# Patient Record
Sex: FEMALE | Race: WHITE | NOT HISPANIC OR LATINO
[De-identification: names, ages, dates, MRNs, and addresses within clinical notes are randomized per-mention and may not be internally consistent; named-entity substitution may affect disease eponyms.]

---

## 2018-05-08 ENCOUNTER — RECORD ABSTRACTING (OUTPATIENT)
Age: 73
End: 2018-05-08

## 2018-05-08 DIAGNOSIS — Z87.19 PERSONAL HISTORY OF OTHER DISEASES OF THE DIGESTIVE SYSTEM: ICD-10-CM

## 2018-05-08 DIAGNOSIS — Z98.890 OTHER SPECIFIED POSTPROCEDURAL STATES: ICD-10-CM

## 2018-05-08 DIAGNOSIS — H43.819 VITREOUS DEGENERATION, UNSPECIFIED EYE: ICD-10-CM

## 2018-05-08 RX ORDER — METOPROLOL SUCCINATE 200 MG/1
TABLET, EXTENDED RELEASE ORAL DAILY
Refills: 0 | Status: ACTIVE | COMMUNITY

## 2018-05-08 RX ORDER — LEVOTHYROXINE SODIUM 0.17 MG/1
TABLET ORAL DAILY
Refills: 0 | Status: ACTIVE | COMMUNITY

## 2018-05-08 RX ORDER — PANTOPRAZOLE 40 MG/1
TABLET, DELAYED RELEASE ORAL DAILY
Refills: 0 | Status: ACTIVE | COMMUNITY

## 2018-05-08 RX ORDER — CALCIUM CARBONATE/VITAMIN D3 600 MG-20
TABLET ORAL
Refills: 0 | Status: ACTIVE | COMMUNITY

## 2018-05-08 RX ORDER — MONTELUKAST SODIUM 10 MG/1
TABLET, FILM COATED ORAL
Refills: 0 | Status: ACTIVE | COMMUNITY

## 2018-05-08 RX ORDER — EPINEPHRINE 0.3 MG/.3ML
INJECTION INTRAMUSCULAR
Refills: 0 | Status: ACTIVE | COMMUNITY

## 2018-05-08 RX ORDER — MULTIVITAMIN
CAPSULE ORAL
Refills: 0 | Status: ACTIVE | COMMUNITY

## 2018-06-15 ENCOUNTER — APPOINTMENT (OUTPATIENT)
Dept: BREAST CENTER | Facility: CLINIC | Age: 73
End: 2018-06-15
Payer: MEDICARE

## 2018-06-15 VITALS
SYSTOLIC BLOOD PRESSURE: 125 MMHG | HEART RATE: 67 BPM | BODY MASS INDEX: 23.92 KG/M2 | DIASTOLIC BLOOD PRESSURE: 68 MMHG | WEIGHT: 130 LBS | HEIGHT: 62 IN

## 2018-06-15 DIAGNOSIS — Z87.891 PERSONAL HISTORY OF NICOTINE DEPENDENCE: ICD-10-CM

## 2018-06-15 DIAGNOSIS — Z80.1 FAMILY HISTORY OF MALIGNANT NEOPLASM OF TRACHEA, BRONCHUS AND LUNG: ICD-10-CM

## 2018-06-15 DIAGNOSIS — E07.9 DISORDER OF THYROID, UNSPECIFIED: ICD-10-CM

## 2018-06-15 DIAGNOSIS — Z83.49 FAMILY HISTORY OF OTHER ENDOCRINE, NUTRITIONAL AND METABOLIC DISEASES: ICD-10-CM

## 2018-06-15 DIAGNOSIS — Z82.49 FAMILY HISTORY OF ISCHEMIC HEART DISEASE AND OTHER DISEASES OF THE CIRCULATORY SYSTEM: ICD-10-CM

## 2018-06-15 PROCEDURE — 99215 OFFICE O/P EST HI 40 MIN: CPT

## 2018-06-15 RX ORDER — PRAVASTATIN SODIUM 20 MG/1
20 TABLET ORAL
Qty: 90 | Refills: 0 | Status: ACTIVE | COMMUNITY
Start: 2018-03-16

## 2018-06-15 RX ORDER — PREDNISONE 10 MG/1
10 TABLET ORAL
Qty: 9 | Refills: 0 | Status: DISCONTINUED | COMMUNITY
Start: 2018-05-18

## 2018-06-15 RX ORDER — LOTEPREDNOL ETABONATE AND TOBRAMYCIN 5; 3 MG/ML; MG/ML
0.5-0.3 SUSPENSION/ DROPS OPHTHALMIC
Qty: 5 | Refills: 0 | Status: DISCONTINUED | COMMUNITY
Start: 2018-04-08

## 2018-06-15 RX ORDER — AMOXICILLIN 875 MG/1
875 TABLET, FILM COATED ORAL
Qty: 20 | Refills: 0 | Status: DISCONTINUED | COMMUNITY
Start: 2018-01-03

## 2018-06-15 RX ORDER — PRAVASTATIN SODIUM 40 MG/1
TABLET ORAL DAILY
Refills: 0 | Status: DISCONTINUED | COMMUNITY
End: 2018-06-15

## 2018-06-15 RX ORDER — LEVOFLOXACIN 500 MG/1
500 TABLET, FILM COATED ORAL
Qty: 7 | Refills: 0 | Status: DISCONTINUED | COMMUNITY
Start: 2018-06-04

## 2018-06-15 RX ORDER — ALPRAZOLAM 0.25 MG/1
0.25 TABLET ORAL
Qty: 90 | Refills: 0 | Status: ACTIVE | COMMUNITY
Start: 2018-02-23

## 2018-06-15 RX ORDER — CEFDINIR 300 MG/1
300 CAPSULE ORAL
Qty: 10 | Refills: 0 | Status: DISCONTINUED | COMMUNITY
Start: 2018-05-24

## 2018-06-15 RX ORDER — FLUCONAZOLE 150 MG/1
150 TABLET ORAL
Qty: 2 | Refills: 0 | Status: DISCONTINUED | COMMUNITY
Start: 2018-05-11

## 2018-06-15 RX ORDER — AMOXICILLIN 500 MG/1
500 CAPSULE ORAL
Qty: 21 | Refills: 0 | Status: DISCONTINUED | COMMUNITY
Start: 2018-03-26

## 2018-06-15 RX ORDER — GABAPENTIN 300 MG/1
300 CAPSULE ORAL
Qty: 90 | Refills: 0 | Status: DISCONTINUED | COMMUNITY
Start: 2018-01-05

## 2018-06-15 RX ORDER — HYDROCORTISONE 25 MG/G
2.5 CREAM TOPICAL
Qty: 57 | Refills: 0 | Status: ACTIVE | COMMUNITY
Start: 2017-12-26

## 2018-06-15 RX ORDER — CEFUROXIME AXETIL 250 MG/1
250 TABLET ORAL
Qty: 14 | Refills: 0 | Status: DISCONTINUED | COMMUNITY
Start: 2018-05-23

## 2019-06-14 ENCOUNTER — APPOINTMENT (OUTPATIENT)
Dept: BREAST CENTER | Facility: CLINIC | Age: 74
End: 2019-06-14

## 2019-06-17 ENCOUNTER — APPOINTMENT (OUTPATIENT)
Dept: BREAST CENTER | Facility: CLINIC | Age: 74
End: 2019-06-17
Payer: MEDICARE

## 2019-06-17 VITALS — WEIGHT: 135 LBS | HEIGHT: 62 IN | BODY MASS INDEX: 24.84 KG/M2

## 2019-06-17 PROCEDURE — 99215 OFFICE O/P EST HI 40 MIN: CPT

## 2019-06-17 NOTE — HISTORY OF PRESENT ILLNESS
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 She is s/p left 10-11:00 retro USGBx 6/15/15 pathology showed dense stromal fibrosis and no atypia nor malignancy.\par \par \par Patient has no breast complaints today. Patient had breast imaging today which was normal. She is still suffering form her allergies and sinus issues. \par \par She does SBE.\par She has not noticed a change in her breast or a breast lump.\par She has not noticed a change in her nipple or nipple area.\par She is not experiencing nipple discharge.\par She is not  experiencing breast pain.\par She has not noticed a lump or lymph node in her axilla.\par She has no other breast concerns. \par \par \par BREAST CANCER RISK FACTORS\par Menarche: 13\par Menopause: 49\par \par OCP: N\par HRT: Y (unsure how long)\par Hysterectomy/Oophorectomy: N\par Last pap/pelvic: 2018. has appointment next month\par SBE: Yes no concerns at this time\par Family History:  N\par Ashkenazi: Y\par BRCA testing: N\par TC/NCI: 12.13%\par Last colonoscopy:  which was normal\par \par Last mammogram: 2019                 Location:  [place]\par The report was reviewed.                                                                    Images reviewed on PACS.    \par Results: BIRADS 2\par No evidence of malignancy, Heterogenously dense breast tissue\par \par Last ultrasound: 2019             Location[place]  [place]\par The report was reviewed.                                                                        Images reviewed on PACS.\par Results: BIRADS 2\par No evidence of malignancy

## 2019-06-17 NOTE — ASSESSMENT
[FreeTextEntry1] : 72 yo female followed for ADH/ALH\par does not want screening mri\par due for mg/sono JUN 2020\par f/u in one year\par We reviewed risk reduction strategies including maintaining a BMI <25, limiting red meat intake and alcoholic beverages to 3 per week and exercise (150 min/ week low intensity or 75 min/week high intensity). And maintaining a normal vitamin D level.\par \par \par She knows to call or return sooner should any changes or concerns arise.\par \par

## 2019-06-17 NOTE — REVIEW OF SYSTEMS
[Sore Throat] : sore throat [Constipation] : constipation [Joint Pain] : joint pain [Negative] : Heme/Lymph [FreeTextEntry4] : Sinus Pressure [FreeTextEntry9] : Back Pain

## 2019-06-17 NOTE — PHYSICAL EXAM
[Normocephalic] : normocephalic [Atraumatic] : atraumatic [Supple] : supple [Examined in the supine and seated position] : examined in the supine and seated position [No dominant masses] : no dominant masses in right breast  [No Supraclavicular Adenopathy] : no supraclavicular adenopathy [No Nipple Retraction] : no right nipple retraction [No dominant masses] : no dominant masses left breast [No Nipple Discharge] : no right nipple discharge [No Axillary Lymphadenopathy] : no left axillary lymphadenopathy [No Ulceration] : no ulceration [No Edema] : no edema [No Rashes] : no rashes [de-identified] : s/p PMx [de-identified] : healed axillary incision

## 2020-06-22 ENCOUNTER — APPOINTMENT (OUTPATIENT)
Dept: BREAST CENTER | Facility: CLINIC | Age: 75
End: 2020-06-22
Payer: MEDICARE

## 2020-06-22 VITALS
SYSTOLIC BLOOD PRESSURE: 130 MMHG | WEIGHT: 135 LBS | BODY MASS INDEX: 24.84 KG/M2 | HEART RATE: 86 BPM | DIASTOLIC BLOOD PRESSURE: 75 MMHG | HEIGHT: 62 IN

## 2020-06-22 DIAGNOSIS — Z12.31 ENCOUNTER FOR SCREENING MAMMOGRAM FOR MALIGNANT NEOPLASM OF BREAST: ICD-10-CM

## 2020-06-22 PROCEDURE — 99215 OFFICE O/P EST HI 40 MIN: CPT

## 2020-06-22 NOTE — PHYSICAL EXAM
[Normocephalic] : normocephalic [Supple] : supple [Atraumatic] : atraumatic [No Supraclavicular Adenopathy] : no supraclavicular adenopathy [Examined in the supine and seated position] : examined in the supine and seated position [No dominant masses] : no dominant masses in right breast  [No dominant masses] : no dominant masses left breast [No Nipple Retraction] : no left nipple retraction [No Nipple Discharge] : no left nipple discharge [No Axillary Lymphadenopathy] : no left axillary lymphadenopathy [No Edema] : no edema [No Rashes] : no rashes [No Ulceration] : no ulceration [de-identified] : s/p PMx [de-identified] : healed axillary incision

## 2020-06-22 NOTE — HISTORY OF PRESENT ILLNESS
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 She is s/p left 10-11:00 retro USGBx 6/15/15 pathology showed dense stromal fibrosis and no atypia nor malignancy.\par \par \par Patient has no breast complaints today. Patient had breast imaging today. She is still suffering form her allergies and sinus issues. \par \par She does SBE.\par She has not noticed a change in her breast or a breast lump.\par She has not noticed a change in her nipple or nipple area.\par She is not experiencing nipple discharge.\par She is not  experiencing breast pain.\par She has not noticed a lump or lymph node in her axilla.\par She has no other breast concerns. \par \par \par BREAST CANCER RISK FACTORS\par Menarche: 13\par Menopause: 49\par \par OCP: N\par HRT: Y (unsure how long)\par Hysterectomy/Oophorectomy: N\par Last pap/pelvic: 2019 WNL \par SBE: Yes no concerns at this time\par Family History:  N\par Ashkenazi: Y\par BRCA testing: N\par TC/NCI: 12.13%\par Last colonoscopy:  which was normal\par \par Last mammogram: 2020                 Location:  [place]\par The report was reviewed.                     Images viewed on PACS\par Results; BIRADS 2 No suspicious findings                                                                 \par \par Last ultrasound: 2020                 Location:  [place]\par The report was reviewed.                     Images viewed on PACS\par Results; BIRADS 2 No suspicious findings

## 2020-06-22 NOTE — ASSESSMENT
[FreeTextEntry1] : 73 yo female followed for ADH/ALH\par does not want screening mri\par vit d is 42\par due for mg/sono JUN 2021\par f/u in one year\par We reviewed risk reduction strategies including maintaining a BMI <25, limiting red meat intake and alcoholic beverages to 3 per week and exercise (150 min/ week low intensity or 75 min/week high intensity). And maintaining a normal vitamin D level.\par \par \par She knows to call or return sooner should any changes or concerns arise.\par \par

## 2020-12-23 PROBLEM — Z12.31 ENCOUNTER FOR SCREENING MAMMOGRAM FOR MALIGNANT NEOPLASM OF BREAST: Status: RESOLVED | Noted: 2018-05-08 | Resolved: 2020-12-23

## 2021-06-28 ENCOUNTER — APPOINTMENT (OUTPATIENT)
Dept: BREAST CENTER | Facility: CLINIC | Age: 76
End: 2021-06-28
Payer: MEDICARE

## 2021-06-28 ENCOUNTER — NON-APPOINTMENT (OUTPATIENT)
Age: 76
End: 2021-06-28

## 2021-06-28 VITALS
DIASTOLIC BLOOD PRESSURE: 75 MMHG | SYSTOLIC BLOOD PRESSURE: 147 MMHG | BODY MASS INDEX: 24.11 KG/M2 | HEART RATE: 67 BPM | HEIGHT: 62 IN | WEIGHT: 131 LBS

## 2021-06-28 DIAGNOSIS — Z80.8 FAMILY HISTORY OF MALIGNANT NEOPLASM OF OTHER ORGANS OR SYSTEMS: ICD-10-CM

## 2021-06-28 DIAGNOSIS — Z12.39 ENCOUNTER FOR OTHER SCREENING FOR MALIGNANT NEOPLASM OF BREAST: ICD-10-CM

## 2021-06-28 DIAGNOSIS — Z80.42 FAMILY HISTORY OF MALIGNANT NEOPLASM OF PROSTATE: ICD-10-CM

## 2021-06-28 PROCEDURE — 99214 OFFICE O/P EST MOD 30 MIN: CPT

## 2021-06-28 RX ORDER — FLUTICASONE PROPIONATE AND SALMETEROL 50; 500 UG/1; UG/1
POWDER RESPIRATORY (INHALATION)
Refills: 0 | Status: DISCONTINUED | COMMUNITY
End: 2021-06-28

## 2021-06-28 RX ORDER — CLOTRIMAZOLE AND BETAMETHASONE DIPROPIONATE 10; .5 MG/G; MG/G
1-0.05 CREAM TOPICAL
Qty: 15 | Refills: 0 | Status: DISCONTINUED | COMMUNITY
Start: 2018-06-04 | End: 2021-06-28

## 2021-06-28 RX ORDER — ALENDRONATE SODIUM 70 MG/1
70 TABLET ORAL
Refills: 0 | Status: DISCONTINUED | COMMUNITY
End: 2021-06-28

## 2021-06-28 RX ORDER — ALBUTEROL SULFATE 108 UG/1
AEROSOL, METERED RESPIRATORY (INHALATION)
Refills: 0 | Status: DISCONTINUED | COMMUNITY
End: 2021-06-28

## 2021-06-28 RX ORDER — DENOSUMAB 60 MG/ML
60 INJECTION SUBCUTANEOUS
Refills: 0 | Status: ACTIVE | COMMUNITY

## 2021-06-28 NOTE — ASSESSMENT
[FreeTextEntry1] : 74 yo female followed for ADH/ALH\par does not want screening mri\par vit d is 64\par due for mg/sono JUN 2022\par f/u in one year\par We reviewed risk reduction strategies including maintaining a BMI <25, limiting red meat intake and alcoholic beverages to 3 per week and exercise (150 min/ week low intensity or 75 min/week high intensity). And maintaining a normal vitamin D level.\par \par \par She knows to call or return sooner should any changes or concerns arise.\par \par

## 2021-06-28 NOTE — PHYSICAL EXAM
[Normocephalic] : normocephalic [Atraumatic] : atraumatic [Supple] : supple [No Supraclavicular Adenopathy] : no supraclavicular adenopathy [Examined in the supine and seated position] : examined in the supine and seated position [No dominant masses] : no dominant masses in right breast  [No dominant masses] : no dominant masses left breast [No Nipple Retraction] : no left nipple retraction [No Nipple Discharge] : no left nipple discharge [No Axillary Lymphadenopathy] : no left axillary lymphadenopathy [No Edema] : no edema [No Rashes] : no rashes [No Ulceration] : no ulceration [Symmetrical] : symmetrical [de-identified] : s/p PMx [de-identified] : healed axillary incision

## 2021-06-28 NOTE — HISTORY OF PRESENT ILLNESS
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 She is s/p left 10-11:00 retro USGBx 6/15/15 pathology showed dense stromal fibrosis and no atypia nor malignancy.\par \par \par Patient has no breast complaints today. Patient had breast imaging today. She is still suffering form her allergies and sinus issues. She completed [employer] left arm 2021.\par \par She does SBE.\par She has not noticed a change in her breast or a breast lump.\par She has not noticed a change in her nipple or nipple area.\par She is not experiencing nipple discharge.\par She is not  experiencing breast pain.\par She has not noticed a lump or lymph node in her axilla.\par She has no other breast concerns. \par \par \par BREAST CANCER RISK FACTORS\par Menarche: 13\par Menopause: 49\par \par OCP: N\par HRT: Y (unsure how long)\par Hysterectomy/Oophorectomy: N\par Last pap/pelvic: 2019 WNL  Pt has appt for 2021\par SBE: Yes no concerns at this time\par Family History:  N\par Ashkenazi: Y\par BRCA testing: N\par TC/NCI: 12.13%\par Last colonoscopy:  which was normal\par \par Last mammogram: 2021                 Location:  [place]\par The report was reviewed.                     Images viewed on PACS\par Results; BIRADS 2\par Scattered fibroglandular densities.\par No evidence of malignancy.                                                               \par \par Last ultrasound: 2021                 Location:  [place]\par The report was reviewed.                     Images viewed on PACS\par Results; BIRADS 2\par  No suspicious findings

## 2022-07-17 ENCOUNTER — RESULT REVIEW (OUTPATIENT)
Age: 77
End: 2022-07-17

## 2022-07-18 ENCOUNTER — APPOINTMENT (OUTPATIENT)
Dept: BREAST CENTER | Facility: CLINIC | Age: 77
End: 2022-07-18

## 2022-07-18 VITALS
SYSTOLIC BLOOD PRESSURE: 145 MMHG | DIASTOLIC BLOOD PRESSURE: 75 MMHG | WEIGHT: 131 LBS | HEIGHT: 62 IN | BODY MASS INDEX: 24.11 KG/M2 | HEART RATE: 74 BPM

## 2022-07-18 DIAGNOSIS — Z00.00 ENCOUNTER FOR GENERAL ADULT MEDICAL EXAMINATION W/OUT ABNORMAL FINDINGS: ICD-10-CM

## 2022-07-18 DIAGNOSIS — E78.00 PURE HYPERCHOLESTEROLEMIA, UNSPECIFIED: ICD-10-CM

## 2022-07-18 DIAGNOSIS — J45.909 UNSPECIFIED ASTHMA, UNCOMPLICATED: ICD-10-CM

## 2022-07-18 DIAGNOSIS — I10 ESSENTIAL (PRIMARY) HYPERTENSION: ICD-10-CM

## 2022-07-18 PROCEDURE — 99214 OFFICE O/P EST MOD 30 MIN: CPT

## 2022-07-18 RX ORDER — ASPIRIN 81 MG
81 TABLET, DELAYED RELEASE (ENTERIC COATED) ORAL DAILY
Refills: 0 | Status: DISCONTINUED | COMMUNITY
End: 2022-07-18

## 2022-07-18 NOTE — ASSESSMENT
[FreeTextEntry1] : 77 yo female followed for ADH/ALH\par Discussed screening MRI again with patient and she does not want screening mri\par vit d level recently was 27 - patient is seeing an endocrinologist in October for her low vitamin D \par due for mg/sono JUN 2023\par f/u in one year\par We reviewed risk reduction strategies including maintaining a BMI <25, limiting red meat intake and alcoholic beverages to 3 per week and exercise (150 min/ week low intensity or 75 min/week high intensity). And maintaining a normal vitamin D level.\par \par \par She knows to call or return sooner should any changes or concerns arise.\par \par

## 2022-07-18 NOTE — PHYSICAL EXAM
[Normocephalic] : normocephalic [Atraumatic] : atraumatic [Supple] : supple [No Supraclavicular Adenopathy] : no supraclavicular adenopathy [Examined in the supine and seated position] : examined in the supine and seated position [Symmetrical] : symmetrical [No dominant masses] : no dominant masses in right breast  [No dominant masses] : no dominant masses left breast [No Nipple Retraction] : no left nipple retraction [No Nipple Discharge] : no left nipple discharge [No Axillary Lymphadenopathy] : no left axillary lymphadenopathy [No Edema] : no edema [No Rashes] : no rashes [No Ulceration] : no ulceration [de-identified] : s/p PMx [de-identified] : healed axillary incision

## 2022-07-18 NOTE — HISTORY OF PRESENT ILLNESS
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 She is s/p left 10-11:00 retro USGBx 6/15/15 pathology showed dense stromal fibrosis and no atypia nor malignancy.\par \par Patient denies any new medical history. Has been having some issues with her back and is doing PT for that. No new family history of cancer. Last physical with Dr. Greene was last year and was noted to have low Vitamin D for which patient is taking supplements. Patient's last OB/GYN appointment with Dr. Heredia was last year and WNL. Patient has no breast complaints today. \par \par She does SBE.\par She has not noticed a change in her breast or a breast lump.\par She has not noticed a change in her nipple or nipple area.\par She is not experiencing nipple discharge.\par She is not  experiencing breast pain.\par She has not noticed a lump or lymph node in her axilla.\par She has no other breast concerns. \par \par BREAST CANCER RISK FACTORS\par Menarche: 13\par Menopause: 49\par \par OCP: N\par HRT: Y (unsure how long)\par Hysterectomy/Oophorectomy: N\par Last pap/pelvic: 2021 WNL \par SBE: Yes no concerns at this time\par Family History:  N\par Ashkenazi: Y\par BRCA testing: N\par TC/NCI: 12.13%\par Last colonoscopy:  which was normal\par \par Last mammogram: 2022                Location:  [place]\par The report was reviewed.                     Images viewed on PACS\par Results; BIRADS 2\par No evidence of malignancy.                                                 \par \par Last ultrasound: 2022               Location:  [place]\par The report was reviewed.                     Images viewed on PACS\par Results; BIRADS 2\par No evidence of malignancy.

## 2022-07-18 NOTE — REVIEW OF SYSTEMS
[Sore Throat] : sore throat [Constipation] : constipation [Negative] : Heme/Lymph [FreeTextEntry4] : Sinus Pressure [FreeTextEntry9] : Back Pain

## 2022-12-07 ENCOUNTER — OFFICE (OUTPATIENT)
Dept: URBAN - METROPOLITAN AREA CLINIC 29 | Facility: CLINIC | Age: 77
Setting detail: OPHTHALMOLOGY
End: 2022-12-07
Payer: MEDICARE

## 2022-12-07 ENCOUNTER — RX ONLY (RX ONLY)
Age: 77
End: 2022-12-07

## 2022-12-07 DIAGNOSIS — H43.812: ICD-10-CM

## 2022-12-07 DIAGNOSIS — H16.223: ICD-10-CM

## 2022-12-07 DIAGNOSIS — H10.45: ICD-10-CM

## 2022-12-07 DIAGNOSIS — H01.005: ICD-10-CM

## 2022-12-07 DIAGNOSIS — H43.813: ICD-10-CM

## 2022-12-07 DIAGNOSIS — H01.004: ICD-10-CM

## 2022-12-07 DIAGNOSIS — H01.001: ICD-10-CM

## 2022-12-07 DIAGNOSIS — H25.13: ICD-10-CM

## 2022-12-07 DIAGNOSIS — H01.002: ICD-10-CM

## 2022-12-07 PROCEDURE — 92014 COMPRE OPH EXAM EST PT 1/>: CPT | Performed by: OPHTHALMOLOGY

## 2022-12-07 ASSESSMENT — REFRACTION_AUTOREFRACTION
OD_SPHERE: PLANO
OS_AXIS: 167
OD_CYLINDER: +1.50
OD_AXIS: 2
OS_CYLINDER: +1.50
OS_SPHERE: +1.00

## 2022-12-07 ASSESSMENT — AXIALLENGTH_DERIVED
OS_AL: 22.7999
OS_AL: 22.7999
OD_AL: 22.59
OD_AL: 22.907

## 2022-12-07 ASSESSMENT — REFRACTION_MANIFEST
OD_VA2: 20/20(J1+)
OS_VA1: 20/25+
OS_CYLINDER: +1.50
OD_SPHERE: +0.25
OS_SPHERE: PLANO
OD_CYLINDER: +1.75
OS_SPHERE: +1.00
OS_CYLINDER: +1.00
OD_SPHERE: +0.75
OD_AXIS: 180
OD_CYLINDER: +1.00
OS_VA2: 20/20(J1+)
OS_AXIS: 165
OD_VA1: 20/25
OD_AXIS: 180
OS_AXIS: 164

## 2022-12-07 ASSESSMENT — LID EXAM ASSESSMENTS
OS_BLEPHARITIS: 1+
OD_BLEPHARITIS: 1+

## 2022-12-07 ASSESSMENT — KERATOMETRY
OS_K2POWER_DIOPTERS: 44.00
OS_AXISANGLE_DEGREES: 149
OD_K1POWER_DIOPTERS: 44.25
OD_AXISANGLE_DEGREES: 11
OS_K1POWER_DIOPTERS: 43.75
OD_K2POWER_DIOPTERS: 45.00

## 2022-12-07 ASSESSMENT — VISUAL ACUITY
OS_BCVA: 20/40-1
OD_BCVA: 20/30

## 2022-12-07 ASSESSMENT — CONFRONTATIONAL VISUAL FIELD TEST (CVF)
OS_FINDINGS: FULL
OD_FINDINGS: FULL

## 2022-12-07 ASSESSMENT — SPHEQUIV_DERIVED
OS_SPHEQUIV: 1.75
OS_SPHEQUIV: 1.75
OD_SPHEQUIV: 1.625
OD_SPHEQUIV: 0.75

## 2022-12-07 ASSESSMENT — SUPERFICIAL PUNCTATE KERATITIS (SPK)
OD_SPK: 1+
OS_SPK: 1+

## 2023-07-23 ENCOUNTER — RESULT REVIEW (OUTPATIENT)
Age: 78
End: 2023-07-23

## 2023-07-24 ENCOUNTER — APPOINTMENT (OUTPATIENT)
Dept: BREAST CENTER | Facility: CLINIC | Age: 78
End: 2023-07-24

## 2023-08-07 ENCOUNTER — APPOINTMENT (OUTPATIENT)
Dept: BREAST CENTER | Facility: CLINIC | Age: 78
End: 2023-08-07

## 2023-09-15 ENCOUNTER — APPOINTMENT (OUTPATIENT)
Dept: BREAST CENTER | Facility: CLINIC | Age: 78
End: 2023-09-15
Payer: MEDICARE

## 2023-09-15 VITALS
HEIGHT: 62 IN | DIASTOLIC BLOOD PRESSURE: 68 MMHG | BODY MASS INDEX: 25.4 KG/M2 | HEART RATE: 72 BPM | WEIGHT: 138 LBS | SYSTOLIC BLOOD PRESSURE: 140 MMHG

## 2023-09-15 DIAGNOSIS — N60.19 DIFFUSE CYSTIC MASTOPATHY OF UNSPECIFIED BREAST: ICD-10-CM

## 2023-09-15 DIAGNOSIS — R92.2 INCONCLUSIVE MAMMOGRAM: ICD-10-CM

## 2023-09-15 DIAGNOSIS — Z12.31 ENCOUNTER FOR SCREENING MAMMOGRAM FOR MALIGNANT NEOPLASM OF BREAST: ICD-10-CM

## 2023-09-15 PROCEDURE — 99214 OFFICE O/P EST MOD 30 MIN: CPT

## 2024-02-27 ENCOUNTER — OFFICE (OUTPATIENT)
Dept: URBAN - METROPOLITAN AREA CLINIC 29 | Facility: CLINIC | Age: 79
Setting detail: OPHTHALMOLOGY
End: 2024-02-27
Payer: MEDICARE

## 2024-02-27 DIAGNOSIS — H25.13: ICD-10-CM

## 2024-02-27 DIAGNOSIS — H01.005: ICD-10-CM

## 2024-02-27 DIAGNOSIS — H43.812: ICD-10-CM

## 2024-02-27 DIAGNOSIS — H43.813: ICD-10-CM

## 2024-02-27 DIAGNOSIS — H01.002: ICD-10-CM

## 2024-02-27 DIAGNOSIS — H01.001: ICD-10-CM

## 2024-02-27 DIAGNOSIS — H01.004: ICD-10-CM

## 2024-02-27 DIAGNOSIS — H10.45: ICD-10-CM

## 2024-02-27 DIAGNOSIS — H16.223: ICD-10-CM

## 2024-02-27 PROCEDURE — 92014 COMPRE OPH EXAM EST PT 1/>: CPT | Performed by: OPHTHALMOLOGY

## 2024-02-27 ASSESSMENT — SPHEQUIV_DERIVED
OS_SPHEQUIV: 1.75
OD_SPHEQUIV: 1.625
OS_SPHEQUIV: 1.625
OD_SPHEQUIV: 1.125
OD_SPHEQUIV: 0.75

## 2024-02-27 ASSESSMENT — REFRACTION_CURRENTRX
OD_SPHERE: +2.00
OS_OVR_VA: 20/
OD_ADD: +1.00
OS_AXIS: 090
OD_OVR_VA: 20/
OS_SPHERE: +2.00
OD_AXIS: 090
OS_CYLINDER: SPH
OD_CYLINDER: SPH
OS_ADD: +1.00

## 2024-02-27 ASSESSMENT — REFRACTION_MANIFEST
OS_AXIS: 164
OS_CYLINDER: +1.50
OS_VA2: 20/20(J1+)
OD_CYLINDER: +1.75
OS_VA1: 20/25+
OD_AXIS: 180
OD_AXIS: 180
OD_SPHERE: +0.25
OS_SPHERE: PLANO
OD_VA1: 20/25
OS_SPHERE: +1.00
OD_CYLINDER: +1.00
OS_AXIS: 165
OS_CYLINDER: +1.00
OD_VA2: 20/20(J1+)
OD_SPHERE: +0.75

## 2024-02-27 ASSESSMENT — SUPERFICIAL PUNCTATE KERATITIS (SPK)
OD_SPK: 1+
OS_SPK: 1+

## 2024-02-27 ASSESSMENT — REFRACTION_AUTOREFRACTION
OS_AXIS: 165
OD_AXIS: 005
OS_CYLINDER: +1.75
OD_SPHERE: +0.25
OS_SPHERE: +0.75
OD_CYLINDER: +1.75

## 2024-02-27 ASSESSMENT — LID EXAM ASSESSMENTS
OD_BLEPHARITIS: 1+
OS_BLEPHARITIS: 1+

## 2024-02-27 ASSESSMENT — CONFRONTATIONAL VISUAL FIELD TEST (CVF)
OS_FINDINGS: FULL
OD_FINDINGS: FULL

## 2024-08-01 ENCOUNTER — RESULT REVIEW (OUTPATIENT)
Age: 79
End: 2024-08-01

## 2024-08-02 ENCOUNTER — APPOINTMENT (OUTPATIENT)
Dept: BREAST CENTER | Facility: CLINIC | Age: 79
End: 2024-08-02
Payer: MEDICARE

## 2024-08-02 VITALS
BODY MASS INDEX: 25.4 KG/M2 | HEIGHT: 62 IN | SYSTOLIC BLOOD PRESSURE: 140 MMHG | DIASTOLIC BLOOD PRESSURE: 72 MMHG | HEART RATE: 65 BPM | WEIGHT: 138 LBS

## 2024-08-02 DIAGNOSIS — Z12.31 ENCOUNTER FOR SCREENING MAMMOGRAM FOR MALIGNANT NEOPLASM OF BREAST: ICD-10-CM

## 2024-08-02 DIAGNOSIS — N60.19 DIFFUSE CYSTIC MASTOPATHY OF UNSPECIFIED BREAST: ICD-10-CM

## 2024-08-02 PROCEDURE — 99213 OFFICE O/P EST LOW 20 MIN: CPT

## 2024-08-02 NOTE — HISTORY OF PRESENT ILLNESS
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 She is s/p left 10-11:00 retro USGBx 6/15/15 pathology showed dense stromal fibrosis and no atypia nor malignancy.  Patient denies any new medical history.  No new family history of cancer. Last physical with Dr. Greene was last year and was noted to have low Vitamin D for which patient is taking supplements.  Patient has no breast complaints today.   She does SBE. She has not noticed a change in her breast or a breast lump. She has not noticed a change in her nipple or nipple area. She is not experiencing nipple discharge. She is not  experiencing breast pain. She has not noticed a lump or lymph node in her axilla. She has no other breast concerns.   BREAST CANCER RISK FACTORS Menarche: 13 Menopause: 49  OCP: N HRT: Y (unsure how long) Hysterectomy/Oophorectomy: N Last pap/pelvic:  WNL  SBE: Yes no concerns at this time Family History:  father prostate cancer at 60's Ashkenazi: Y BRCA testing: N TC/NCI: 12.13% Last colonoscopy:  which was normal  Last mammogram:2024              Location:  [place] The report was reviewed.                     Images viewed on PACS Results; BIRADS 2 No evidence of malignancy.                                                   Last ultrasound: 2024      Location:  [place] The report was reviewed.                     Images viewed on PACS Results; BIRADS 2 No evidence of malignancy.

## 2024-08-02 NOTE — CONSULT LETTER
[Dear  ___] : Dear  [unfilled], [Courtesy Letter:] : I had the pleasure of seeing your patient, [unfilled], in my office today. [Please see my note below.] : Please see my note below. [Consult Closing:] : Thank you very much for allowing me to participate in the care of this patient.  If you have any questions, please do not hesitate to contact me. [Sincerely,] : Sincerely, [FreeTextEntry3] : Estefania Davis [place] [address]

## 2024-08-02 NOTE — CONSULT LETTER
[Dear  ___] : Dear  [unfilled], [Courtesy Letter:] : I had the pleasure of seeing your patient, [unfilled], in my office today. [Please see my note below.] : Please see my note below. [Consult Closing:] : Thank you very much for allowing me to participate in the care of this patient.  If you have any questions, please do not hesitate to contact me. [Sincerely,] : Sincerely, [FreeTextEntry3] : Estefania Davis MS [place] [address]

## 2024-08-02 NOTE — PHYSICAL EXAM
[Normocephalic] : normocephalic [Atraumatic] : atraumatic [Supple] : supple [No Supraclavicular Adenopathy] : no supraclavicular adenopathy [Examined in the supine and seated position] : examined in the supine and seated position [Symmetrical] : symmetrical [No dominant masses] : no dominant masses in right breast  [No dominant masses] : no dominant masses left breast [No Nipple Retraction] : no left nipple retraction [No Nipple Discharge] : no left nipple discharge [No Axillary Lymphadenopathy] : no left axillary lymphadenopathy [No Edema] : no edema [No Rashes] : no rashes [No Ulceration] : no ulceration [de-identified] : s/p PMx [de-identified] : healed axillary incision

## 2024-08-02 NOTE — PHYSICAL EXAM
[Normocephalic] : normocephalic [Atraumatic] : atraumatic [Supple] : supple [No Supraclavicular Adenopathy] : no supraclavicular adenopathy [Examined in the supine and seated position] : examined in the supine and seated position [Symmetrical] : symmetrical [No dominant masses] : no dominant masses in right breast  [No dominant masses] : no dominant masses left breast [No Nipple Retraction] : no left nipple retraction [No Nipple Discharge] : no left nipple discharge [No Axillary Lymphadenopathy] : no left axillary lymphadenopathy [No Edema] : no edema [No Rashes] : no rashes [No Ulceration] : no ulceration [de-identified] : s/p PMx [de-identified] : healed axillary incision

## 2024-08-02 NOTE — ASSESSMENT
[FreeTextEntry1] : 79 yo female followed for ADH/ALH Discussed screening MRI again with patient and she does not want screening mri vit d level recently was 27 - patient is seeing an endocrinologist in October for her low vitamin D  due for mg/sono Aug 2025  We reviewed risk reduction strategies including maintaining a BMI <25, limiting red meat intake and alcoholic beverages to 3 per week and exercise (150 min/ week low intensity or 75 min/week high intensity). And maintaining a normal vitamin D level.  f/u in one year She knows to call or return sooner should any changes or concerns arise.

## 2025-09-15 ENCOUNTER — NON-APPOINTMENT (OUTPATIENT)
Age: 80
End: 2025-09-15

## 2025-09-15 ENCOUNTER — APPOINTMENT (OUTPATIENT)
Dept: BREAST CENTER | Facility: CLINIC | Age: 80
End: 2025-09-15
Payer: MEDICARE

## 2025-09-15 ENCOUNTER — RESULT REVIEW (OUTPATIENT)
Age: 80
End: 2025-09-15

## 2025-09-15 VITALS — HEIGHT: 62 IN | WEIGHT: 140 LBS | BODY MASS INDEX: 25.76 KG/M2

## 2025-09-15 DIAGNOSIS — R92.2 INCONCLUSIVE MAMMOGRAM: ICD-10-CM

## 2025-09-15 DIAGNOSIS — N60.19 DIFFUSE CYSTIC MASTOPATHY OF UNSPECIFIED BREAST: ICD-10-CM

## 2025-09-15 DIAGNOSIS — Z12.31 ENCOUNTER FOR SCREENING MAMMOGRAM FOR MALIGNANT NEOPLASM OF BREAST: ICD-10-CM

## 2025-09-15 DIAGNOSIS — S83.209A UNSPECIFIED TEAR OF UNSPECIFIED MENISCUS, CURRENT INJURY, UNSPECIFIED KNEE, INITIAL ENCOUNTER: ICD-10-CM

## 2025-09-15 DIAGNOSIS — Z12.39 ENCOUNTER FOR OTHER SCREENING FOR MALIGNANT NEOPLASM OF BREAST: ICD-10-CM

## 2025-09-15 PROCEDURE — 99213 OFFICE O/P EST LOW 20 MIN: CPT
